# Patient Record
Sex: FEMALE | URBAN - METROPOLITAN AREA
[De-identification: names, ages, dates, MRNs, and addresses within clinical notes are randomized per-mention and may not be internally consistent; named-entity substitution may affect disease eponyms.]

---

## 2024-05-13 ENCOUNTER — TELEPHONE (OUTPATIENT)
Age: 69
End: 2024-05-13

## 2024-05-13 NOTE — TELEPHONE ENCOUNTER
Incoming Refill Request      Medication requested (name and dose): FOLIC ACID 1 MG    Pharmacy where request should be sent: [facility]    Additional details provided by patient: NONE    Best call back number: [phone]    Does the patient have less than a 3 day supply:  [x] Yes  [] No    Eloy Scales Rep  05/13/24, 08:52 EDT

## 2024-05-17 RX ORDER — FOLIC ACID 1 MG/1
1000 TABLET ORAL DAILY
Qty: 90 TABLET | Refills: 3 | OUTPATIENT
Start: 2024-05-17

## 2024-05-28 PROBLEM — M19.041 PRIMARY OSTEOARTHRITIS OF BOTH HANDS: Status: ACTIVE | Noted: 2024-05-28

## 2024-05-28 PROBLEM — M05.79 RHEUMATOID ARTHRITIS INVOLVING MULTIPLE SITES WITH POSITIVE RHEUMATOID FACTOR: Status: ACTIVE | Noted: 2024-05-28

## 2024-05-28 PROBLEM — Z79.899 HIGH RISK MEDICATION USE: Status: ACTIVE | Noted: 2024-05-28

## 2024-05-28 PROBLEM — M19.042 PRIMARY OSTEOARTHRITIS OF BOTH HANDS: Status: ACTIVE | Noted: 2024-05-28

## 2024-05-28 NOTE — PROGRESS NOTES
Office Follow Up      Date: 05/30/2024   Patient Name: Joy Bowden  MRN: [id number]  YOB: 1955    Referring Physician: Reshma Carmona, *     Chief Complaint: Rheumatoid arthritis.      History of Present Illness: Joy Bowden is a 69 y.o. female who is here today for follow up on rheumatoid arthritis.  She continues to do well without swelling.  Occasional wrist pain without swelling.  No SE's of meds. No infections.  Has alpha-1 antitrypsin deficiency and is getting monthly infusions of Aralast. She has COPD.  Pain scale: 2/10. The problem has not changed. The primary symptoms reported include: stiffness. The following symptoms are not reported:  pain and swelling. The patient assesses the interval disease activity as: stable. The patient's assessment of treatment is: helping greatly. The patient is not experiencing side effects. No locations affected since patient's last visit. Associated symptoms include AM stiffness (1 Hour) and change in vision. Pertinent negatives include fever, fatigue, inactivity gelling, eye symptoms, sicca complaints, skin lesion(s), chest pain, changing cough, edema, joint swelling and abdominal pain.     Subjective   Review of Systems: Review of Systems   Constitutional:  Positive for unexpected weight gain. Negative for chills, fatigue, fever and unexpected weight loss.   HENT:  Positive for postnasal drip and voice change. Negative for dental problem, mouth sores, sinus pressure and swollen glands.    Eyes:  Positive for visual disturbance. Negative for photophobia, pain and redness.   Respiratory:  Positive for shortness of breath. Negative for apnea, cough and chest tightness.    Cardiovascular:  Negative for chest pain and leg swelling.   Gastrointestinal:  Positive for abdominal distention, constipation and GERD. Negative for abdominal pain, diarrhea and nausea.   Endocrine:        Hair loss   Genitourinary:  Negative for  dysuria and hematuria.   Musculoskeletal:  Positive for back pain (lower back pain).        Joint tenderness     Skin:  Negative for dry skin, rash, skin lesions and bruise.   Neurological:  Negative for dizziness, seizures, syncope, weakness, headache and memory problem.   Hematological:  Negative for adenopathy. Does not bruise/bleed easily.   Psychiatric/Behavioral:  Positive for sleep disturbance. Negative for suicidal ideas, depressed mood and stress. The patient is not nervous/anxious.    All other systems reviewed and are negative.       Medications:   Current Outpatient Medications:     Aralast NP 1000 MG injection, Infuse 60 mg/kg into a venous catheter 1 (One) Time Per Week., Disp: , Rfl:     Fluticasone-Umeclidin-Vilant (Trelegy Ellipta) 100-62.5-25 MCG/ACT inhaler, Inhale 1 puff Daily. AT THE SAME EACH DAY, Disp: , Rfl:     folic acid (FOLVITE) 1 MG tablet, Take 1 tablet by mouth Daily. 1 tablet by oral route every day, Disp: 30 tablet, Rfl: 0    losartan (COZAAR) 50 MG tablet, Take 1 tablet by mouth Daily., Disp: , Rfl:     methotrexate 2.5 MG tablet, Take 6 tablets by mouth 1 (One) Time Per Week., Disp: , Rfl:     pantoprazole (PROTONIX) 40 MG EC tablet, Take 1 tablet by mouth Daily., Disp: , Rfl:     pravastatin (PRAVACHOL) 40 MG tablet, Take 1 tablet by mouth Daily., Disp: , Rfl:     QUEtiapine (SEROquel) 100 MG tablet, Take 1 tablet by mouth Every Night., Disp: , Rfl:     sertraline (ZOLOFT) 50 MG tablet, Take 1 tablet by mouth Daily., Disp: , Rfl:     gabapentin (NEURONTIN) 100 MG capsule, Take 2 capsules by mouth every night at bedtime., Disp: , Rfl:     Allergies:   Allergies   Allergen Reactions    Acetaminophen Provider Review Needed    Propoxyphene Napsylate [Propoxyphene] Provider Review Needed    Trazodone Other (See Comments)     Pt does not remember the reaction caused        I have reviewed and updated the patient's chief complaint, history of present illness, review of systems, past  "medical history, surgical history, family history, social history, medications and allergy list as appropriate.     Objective    Vital Signs:   Vitals:    05/30/24 0908   BP: 136/72   Pulse: 86   Temp: 97.8 °F (36.6 °C)   Weight: 73.3 kg (161 lb 9.6 oz)   Height: 154.9 cm (61\")   PainSc:   2   PainLoc: Generalized  Comment: Moves     Body mass index is 30.53 kg/m².    Physical Exam:  GENERAL:  The patient is well-developed and well nourished.  Cooperative and oriented x3.  Affect is normal.  Hydration appears normal.  HEENT:  Normocephalic and atraumatic.  No notable alopecia.  No facial rash.  Lids and conjunctiva are normal.  Pupils are equal and sclerae are clear.  I see no oral or nasal ulcers.  Lips, teeth, and gums are within normal limits.  Oropharynx is clear.  NECK:  Neck is supple without adenopathy, masses, or thyromegaly.  CARDIOVASCULAR:  Normal S1, S2.  No murmurs are heard.  LUNGS:  Clear to auscultation and percussion.  Markedly decreased breath sounds.  ABDOMEN:  Soft and nontender without HSM.   EXTREMITIES:  Trace pedal edema.  No cyanosis or clubbing.  SKIN:  Palpation and inspection are normal.  I see no rashes, nodules, psoriatic lesions, or tophi.  NEUROLOGIC:  Gait is normal.    MUSCULOSKELETAL:  Complete joint exam was performed.  There was full range of motion of the shoulders, elbows, wrists, and hands without notable deformities, soft tissue swelling, synovitis, or atrophy except as noted.  Mild discomfort with abduction of the left shoulder with full range of motion.  Mild degenerative changes are seen in the hands.  Hips have good flexion and internal and external rotation.  Knees have no palpable effusions.  There is full flexion and full extension of the knees without pain.  Ankles have no soft tissue swelling or synovitis or major deformities.    BACK:  Straight without scoliosis.  There is mild kyphosis.  Joint Exam 05/30/2024     The following joints were examined and normal: "   Left Glenohumeral, Right Glenohumeral, Left Elbow, Right Elbow, Left Wrist, Right Wrist, Left MCP 1, Right MCP 1, Left MCP 2, Right MCP 2, Left MCP 3, Right MCP 3, Left MCP 4, Right MCP 4, Left MCP 5, Right MCP 5, Left IP (thumb), Right IP (thumb), Left PIP 2 (finger), Right PIP 2 (finger), Left PIP 3 (finger), Right PIP 3 (finger), Left PIP 4 (finger), Right PIP 4 (finger), Left PIP 5 (finger), Right PIP 5 (finger), Left Knee, Right Knee       Patient Global: 8 mm  Provider Global: 1 mm    Assessment / Plan      Assessment & Plan  Rheumatoid arthritis involving multiple sites with positive rheumatoid factor  Onset early 4/22.  Abrupt onset with pain and swelling in MCPs, PIPs, and wrists.  Negative GAVIOTA.  Greatly elevated sedimentation rate at 102 and CRP at 41.  Rheumatoid factor and CCP antibody all high positive.  Positive ASO titer with positive DNase B.  6/22 hand x-ray show no erosions.  MTX started 7/22.    Doing well.   Tender joint count is  0, swollen joint count is 0 , physician global is  1 , visual analog pain scale is 2 , pt global is 8.   CDAI is 9-low disease activity.  Prognosis is good.   Continue MTX.   Order for labs given.   Recheck in 3 months.  Primary osteoarthritis of both hands  Mild and stable.  High risk medication use  Methotrexate.  She understands the need for regular 6 to 8-week labs.  Chronic obstructive pulmonary disease, unspecified COPD type  Apparently associated with alpha-1 antitrypsin deficiency. On Aralast.               Follow Up:   No follow-ups on file.        Girma Steinberg MD  [facility] [place]

## 2024-05-28 NOTE — ASSESSMENT & PLAN NOTE
Onset early 4/22.  Abrupt onset with pain and swelling in MCPs, PIPs, and wrists.  Negative GAVIOTA.  Greatly elevated sedimentation rate at 102 and CRP at 41.  Rheumatoid factor and CCP antibody all high positive.  Positive ASO titer with positive DNase B.  6/22 hand x-ray show no erosions.  MTX started 7/22.    Doing well.   Tender joint count is  0, swollen joint count is 0 , physician global is  1 , visual analog pain scale is 2 , pt global is 8.   CDAI is 9-low disease activity.  Prognosis is good.   Continue MTX.   Order for labs given.   Recheck in 3 months.

## 2024-05-30 ENCOUNTER — OFFICE VISIT (OUTPATIENT)
Age: 69
End: 2024-05-30
Payer: MEDICARE

## 2024-05-30 VITALS
WEIGHT: 161.6 LBS | SYSTOLIC BLOOD PRESSURE: 136 MMHG | DIASTOLIC BLOOD PRESSURE: 72 MMHG | TEMPERATURE: 97.8 F | BODY MASS INDEX: 30.51 KG/M2 | HEART RATE: 86 BPM | HEIGHT: 61 IN

## 2024-05-30 DIAGNOSIS — M19.041 PRIMARY OSTEOARTHRITIS OF BOTH HANDS: ICD-10-CM

## 2024-05-30 DIAGNOSIS — J44.9 CHRONIC OBSTRUCTIVE PULMONARY DISEASE, UNSPECIFIED COPD TYPE: ICD-10-CM

## 2024-05-30 DIAGNOSIS — M05.79 RHEUMATOID ARTHRITIS INVOLVING MULTIPLE SITES WITH POSITIVE RHEUMATOID FACTOR: Primary | ICD-10-CM

## 2024-05-30 DIAGNOSIS — Z79.899 HIGH RISK MEDICATION USE: ICD-10-CM

## 2024-05-30 DIAGNOSIS — M19.042 PRIMARY OSTEOARTHRITIS OF BOTH HANDS: ICD-10-CM

## 2024-05-30 RX ORDER — METHOTREXATE 2.5 MG/1
6 TABLET ORAL WEEKLY
COMMUNITY
Start: 2024-02-23 | End: 2024-05-30 | Stop reason: SDUPTHER

## 2024-05-30 RX ORDER — ALPHA-1-PROTEINASE INHIBITOR (HUMAN) 1000 MG
60 KIT INTRAVENOUS WEEKLY
COMMUNITY
Start: 2024-05-23

## 2024-05-30 RX ORDER — FOLIC ACID 1 MG/1
1 TABLET ORAL DAILY
Qty: 30 TABLET | Refills: 5 | Status: SHIPPED | OUTPATIENT
Start: 2024-05-30

## 2024-05-30 RX ORDER — METHOTREXATE 2.5 MG/1
15 TABLET ORAL WEEKLY
Qty: 30 TABLET | Refills: 3 | Status: SHIPPED | OUTPATIENT
Start: 2024-05-30

## 2024-06-04 ENCOUNTER — TELEPHONE (OUTPATIENT)
Age: 69
End: 2024-06-04
Payer: MEDICARE

## 2024-06-04 NOTE — TELEPHONE ENCOUNTER
----- Message from Girma Steinberg sent at 6/3/2024  2:04 PM EDT -----  Inflammation test is elevated.    Called and left vm for patient to return call    OK TO RELAY

## 2024-06-07 ENCOUNTER — SPECIALTY PHARMACY (OUTPATIENT)
Age: 69
End: 2024-06-07
Payer: MEDICARE

## 2024-06-17 ENCOUNTER — TELEPHONE (OUTPATIENT)
Age: 69
End: 2024-06-17
Payer: MEDICARE

## 2024-09-08 PROBLEM — J44.9 CHRONIC OBSTRUCTIVE PULMONARY DISEASE: Status: ACTIVE | Noted: 2024-09-08

## 2024-09-08 NOTE — ASSESSMENT & PLAN NOTE
Onset early 4/22.  Abrupt onset with pain and swelling in MCPs, PIPs, and wrists.  Negative GAVIOTA.  Greatly elevated sedimentation rate at 102 and CRP at 41.  Rheumatoid factor and CCP antibody all high positive.  Positive ASO titer with positive DNase B.  6/22 hand x-ray show no erosions.  MTX started 7/22.    Doing well.   Tender joint count is  0, swollen joint count is 0 , physician global is  1 , visual analog pain scale is 2 , pt global is 9.   CDAI is 9-low disease activity.  Prognosis is good.   Continue MTX.   She has migratory joint pain.  Today she has some minor pain right wrist.  Order for labs given.   Recheck in 3 months.

## 2024-09-09 ENCOUNTER — OFFICE VISIT (OUTPATIENT)
Age: 69
End: 2024-09-09
Payer: MEDICARE

## 2024-09-09 VITALS
SYSTOLIC BLOOD PRESSURE: 108 MMHG | DIASTOLIC BLOOD PRESSURE: 58 MMHG | HEIGHT: 61 IN | WEIGHT: 166.2 LBS | BODY MASS INDEX: 31.38 KG/M2 | TEMPERATURE: 97.8 F | HEART RATE: 97 BPM

## 2024-09-09 DIAGNOSIS — Z79.899 HIGH RISK MEDICATION USE: ICD-10-CM

## 2024-09-09 DIAGNOSIS — M05.79 RHEUMATOID ARTHRITIS INVOLVING MULTIPLE SITES WITH POSITIVE RHEUMATOID FACTOR: Primary | ICD-10-CM

## 2024-09-09 DIAGNOSIS — M19.041 PRIMARY OSTEOARTHRITIS OF BOTH HANDS: ICD-10-CM

## 2024-09-09 DIAGNOSIS — M19.042 PRIMARY OSTEOARTHRITIS OF BOTH HANDS: ICD-10-CM

## 2024-09-09 DIAGNOSIS — J44.9 CHRONIC OBSTRUCTIVE PULMONARY DISEASE, UNSPECIFIED COPD TYPE: ICD-10-CM

## 2024-09-09 PROCEDURE — 99214 OFFICE O/P EST MOD 30 MIN: CPT | Performed by: NURSE PRACTITIONER

## 2024-09-09 PROCEDURE — G2211 COMPLEX E/M VISIT ADD ON: HCPCS | Performed by: NURSE PRACTITIONER

## 2024-09-09 RX ORDER — FLUTICASONE FUROATE, UMECLIDINIUM BROMIDE AND VILANTEROL TRIFENATATE 200; 62.5; 25 UG/1; UG/1; UG/1
1 POWDER RESPIRATORY (INHALATION)
COMMUNITY
Start: 2024-08-27

## 2024-09-09 NOTE — PROGRESS NOTES
Office Visit       Date: 2024   Patient Name: Joy Bowden  MRN: [id number]  YOB: 1955    Referring Physician: Referring, Self     Chief Complaint:   Chief Complaint   Patient presents with    Rheumatoid Arthritis    Osteoarthritis       History of Present Illness: Joy Bowden is a 69 y.o. female who is here today for follow-up of rheumatoid arthritis.  Today she reports feeling the same.  She rates her pain 0 out of 10 and has 1 hour morning stiffness.  She does rate her global 9 out of 10.  She declines the need for refills today.  We do prescribe her methotrexate 6 tablets weekly and folic acid 1 mg daily.  She had a Aralast infusion since we last saw her.    Subjective   Review of Systems:  Review of symptoms positive for weight gain, hearing loss, hoarseness, nasal sores, shortness of breath, heartburn, hair loss, insomnia, bruising, joint tenderness and low back pain.    Past Medical History:   Past Medical History:   Diagnosis Date    COPD (chronic obstructive pulmonary disease)     Elevated lipids     GERD (gastroesophageal reflux disease)        Past Surgical History:   Past Surgical History:   Procedure Laterality Date    APPENDECTOMY       SECTION      CHOLECYSTECTOMY      HYSTERECTOMY      COMPLETE    TONSILLECTOMY         Family History: History reviewed. No pertinent family history.    Social History:   Social History     Socioeconomic History    Marital status:    Tobacco Use    Smoking status: Former     Types: Cigarettes     Passive exposure: Past    Smokeless tobacco: Never   Vaping Use    Vaping status: Never Used   Substance and Sexual Activity    Alcohol use: Not Currently    Drug use: Never    Sexual activity: Defer       Medications:   Current Outpatient Medications:     Aralast NP 1000 MG injection, Infuse 60 mg/kg into a venous catheter 1 (One) Time Per Week., Disp: , Rfl:     folic acid (FOLVITE) 1 MG tablet, Take 1 tablet by  "mouth Daily. 1 tablet by oral route every day, Disp: 30 tablet, Rfl: 5    gabapentin (NEURONTIN) 100 MG capsule, Take 2 capsules by mouth every night at bedtime., Disp: , Rfl:     losartan (COZAAR) 50 MG tablet, Take 1 tablet by mouth Daily., Disp: , Rfl:     methotrexate 2.5 MG tablet, Take 6 tablets by mouth 1 (One) Time Per Week., Disp: 30 tablet, Rfl: 3    pantoprazole (PROTONIX) 40 MG EC tablet, Take 1 tablet by mouth Daily., Disp: , Rfl:     pravastatin (PRAVACHOL) 40 MG tablet, Take 1 tablet by mouth Daily., Disp: , Rfl:     QUEtiapine (SEROquel) 100 MG tablet, Take 1 tablet by mouth Every Night., Disp: , Rfl:     sertraline (ZOLOFT) 50 MG tablet, Take 1 tablet by mouth Daily., Disp: , Rfl:     Trelegy Ellipta 200-62.5-25 MCG/ACT inhaler, Inhale 1 puff Daily., Disp: , Rfl:     Allergies:   Allergies   Allergen Reactions    Acetaminophen Provider Review Needed    Propoxyphene Napsylate [Propoxyphene] Provider Review Needed    Trazodone Other (See Comments)     Pt does not remember the reaction caused        I have reviewed and updated the patient's chief complaint, history of present illness, review of systems, past medical history, surgical history, family history, social history, medications and allergy list as appropriate.     Objective    Vital Signs:   Vitals:    09/09/24 0854   BP: 108/58   BP Location: Left arm   Patient Position: Sitting   Cuff Size: Adult   Pulse: 97   Temp: 97.8 °F (36.6 °C)   Weight: 75.4 kg (166 lb 3.2 oz)   Height: 154.9 cm (60.98\")   PainSc: 0-No pain     Body mass index is 31.42 kg/m².   Defer to pcp       Physical Exam:  Physical Exam  Vitals reviewed.   Constitutional:       Appearance: Normal appearance.   HENT:      Head: Normocephalic and atraumatic.      Mouth/Throat:      Mouth: Mucous membranes are moist.   Eyes:      Conjunctiva/sclera: Conjunctivae normal.   Cardiovascular:      Rate and Rhythm: Normal rate and regular rhythm.      Pulses: Normal pulses.      Heart " sounds: Normal heart sounds.   Pulmonary:      Effort: Pulmonary effort is normal.      Breath sounds: Normal breath sounds.   Musculoskeletal:         General: Normal range of motion.      Cervical back: Normal range of motion and neck supple.      Comments: OA changes of bilateral hands, CMC squaring bilaterally.  No synovitis   Skin:     General: Skin is warm and dry.   Neurological:      General: No focal deficit present.      Mental Status: She is alert and oriented to person, place, and time. Mental status is at baseline.   Psychiatric:         Mood and Affect: Mood normal.         Behavior: Behavior normal.         Thought Content: Thought content normal.         Judgment: Judgment normal.          Results Review:   Imaging Results (Last 24 Hours)       ** No results found for the last 24 hours. **            Procedures    Assessment / Plan    Assessment/Plan:   Diagnoses and all orders for this visit:    1. Rheumatoid arthritis involving multiple sites with positive rheumatoid factor (Primary)  Assessment & Plan:  Onset early 4/22.  Abrupt onset with pain and swelling in MCPs, PIPs, and wrists.  Negative GAVIOTA.  Greatly elevated sedimentation rate at 102 and CRP at 41.  Rheumatoid factor and CCP antibody all high positive.  Positive ASO titer with positive DNase B.  6/22 hand x-ray show no erosions.  MTX started 7/22.    Doing well.   Tender joint count is  0, swollen joint count is 0 , physician global is  1 , visual analog pain scale is 2 , pt global is 9.   CDAI is 9-low disease activity.  Prognosis is good.   Continue MTX.   She has migratory joint pain.  Today she has some minor pain right wrist.  Order for labs given.   Recheck in 3 months.    Orders:  -     CBC With Manual Differential; Standing  -     Comprehensive Metabolic Panel; Standing  -     C-reactive Protein; Standing  -     Sedimentation Rate; Standing    2. Primary osteoarthritis of both hands  Assessment & Plan:  Mild and stable.  Mild pain  right wrist today.      3. High risk medication use  Assessment & Plan:  Methotrexate.  She understands the need for regular 6 to 8-week labs.    Orders:  -     CBC With Manual Differential; Standing  -     Comprehensive Metabolic Panel; Standing  -     C-reactive Protein; Standing  -     Sedimentation Rate; Standing    4. Chronic obstructive pulmonary disease, unspecified COPD type  Assessment & Plan:  Followed by pulmonology  Started on Aralast infusions every week          Follow Up:   Return in about 3 months (around 12/9/2024) for Dr. Steinberg, JOSE Sin.        JOSE Nguyen  [facility] [place] [facility]

## 2024-09-11 ENCOUNTER — TELEPHONE (OUTPATIENT)
Age: 69
End: 2024-09-11
Payer: MEDICARE

## 2024-09-11 NOTE — TELEPHONE ENCOUNTER
ELVIA REQUESTED A SCHEDULE CHANGE CAUSING CANCELLATIONS. PT CAN BE RESCHEDULED SAME DAY WITH KYLE OR [facility], OR BE SCHEDULED FARTHER OUT WITH [facility].     -DOUG

## 2024-12-04 DIAGNOSIS — Z79.899 HIGH RISK MEDICATION USE: ICD-10-CM

## 2024-12-04 DIAGNOSIS — M05.79 RHEUMATOID ARTHRITIS INVOLVING MULTIPLE SITES WITH POSITIVE RHEUMATOID FACTOR: Primary | ICD-10-CM

## 2024-12-06 RX ORDER — METHOTREXATE 2.5 MG/1
15 TABLET ORAL WEEKLY
Qty: 24 TABLET | Refills: 0 | Status: SHIPPED | OUTPATIENT
Start: 2024-12-06

## 2024-12-06 NOTE — TELEPHONE ENCOUNTER
Patient needs updated labs. I will send in one month worth. I placed the order for labs. Please mail her the order to have done. Thanks

## 2024-12-12 NOTE — ASSESSMENT & PLAN NOTE
Methotrexate.  She understands the need for regular 6 to 8-week labs. New order given.  9/2024 labs showed , CRP 25.1.  She should ensure all cancer screenings are up-to-date.  We will recheck inflammation markers today.   She also had mild elevation in ALT at 39 and mild decrease in GFR at 54 on 9/2024 labs.  We will recheck these today.

## 2024-12-12 NOTE — PROGRESS NOTES
Office Visit       Date: 2024   Patient Name: Joy Bowden  MRN: [id number]  YOB: 1955    Referring Physician: Reshma Carmona, *     Chief Complaint:   Chief Complaint   Patient presents with    Follow-up    Rheumatoid Arthritis    Osteoarthritis       History of Present Illness: Joy Bowden is a 69 y.o. female who is here today for follow-up of rheumatoid arthritis.      Today she reports feeling the same.    She rates her pain 0 out of 10 and has 2 hours of morning stiffness.   We prescribe her methotrexate 6 tablets weekly and folic acid 1 mg daily.  Medication well-tolerated.  No recent illness or infection.  She continues on Aralast with her pulmonologist.    Subjective   Review of Systems:  Review of symptoms positive for hearing loss, voice change, shortness of breath, constipation, GERD, bruising, dry skin.  Otherwise negative ROS.    Past Medical History:   Past Medical History:   Diagnosis Date    COPD (chronic obstructive pulmonary disease)     Elevated lipids     GERD (gastroesophageal reflux disease)        Past Surgical History:   Past Surgical History:   Procedure Laterality Date    APPENDECTOMY       SECTION      CHOLECYSTECTOMY      HYSTERECTOMY      COMPLETE    TONSILLECTOMY         Family History: History reviewed. No pertinent family history.    Social History:   Social History     Socioeconomic History    Marital status:    Tobacco Use    Smoking status: Former     Types: Cigarettes     Passive exposure: Past    Smokeless tobacco: Never   Vaping Use    Vaping status: Never Used   Substance and Sexual Activity    Alcohol use: Not Currently    Drug use: Never    Sexual activity: Defer       Medications:   Current Outpatient Medications:     Aralast NP 1000 MG injection, Infuse 60 mg/kg into a venous catheter 1 (One) Time Per Week., Disp: , Rfl:     folic acid (FOLVITE) 1 MG tablet, Take 1 tablet by mouth Daily. 1 tablet by oral  "route every day, Disp: 90 tablet, Rfl: 1    gabapentin (NEURONTIN) 100 MG capsule, Take 2 capsules by mouth every night at bedtime., Disp: , Rfl:     losartan (COZAAR) 50 MG tablet, Take 1 tablet by mouth Daily., Disp: , Rfl:     methotrexate 2.5 MG tablet, Take 6 tablets by mouth 1 (One) Time Per Week., Disp: 24 tablet, Rfl: 3    pantoprazole (PROTONIX) 40 MG EC tablet, Take 1 tablet by mouth Daily., Disp: , Rfl:     pravastatin (PRAVACHOL) 40 MG tablet, Take 1 tablet by mouth Daily., Disp: , Rfl:     QUEtiapine (SEROquel) 100 MG tablet, Take 1 tablet by mouth Every Night., Disp: , Rfl:     sertraline (ZOLOFT) 50 MG tablet, Take 1 tablet by mouth Daily., Disp: , Rfl:     Trelegy Ellipta 200-62.5-25 MCG/ACT inhaler, Inhale 1 puff Daily., Disp: , Rfl:     Allergies:   Allergies   Allergen Reactions    Acetaminophen Provider Review Needed    Propoxyphene Napsylate [Propoxyphene] Provider Review Needed    Trazodone Other (See Comments)     Pt does not remember the reaction caused        I have reviewed and updated the patient's chief complaint, history of present illness, review of systems, past medical history, surgical history, family history, social history, medications and allergy list as appropriate.     Objective    Vital Signs:   Vitals:    12/17/24 1108   BP: 138/78   Pulse: 109   Temp: 97.1 °F (36.2 °C)   Weight: 75.8 kg (167 lb)   Height: 154.9 cm (61\")   PainSc: 0-No pain       Body mass index is 31.55 kg/m².   Defer to pcp       Physical Exam:  Physical Exam  Vitals reviewed.   Constitutional:       Appearance: Normal appearance.   HENT:      Head: Normocephalic and atraumatic.      Mouth/Throat:      Mouth: Mucous membranes are moist.   Eyes:      Conjunctiva/sclera: Conjunctivae normal.   Cardiovascular:      Rate and Rhythm: Normal rate and regular rhythm.      Pulses: Normal pulses.      Heart sounds: Normal heart sounds.   Pulmonary:      Effort: Pulmonary effort is normal.      Breath sounds: Normal " breath sounds.   Musculoskeletal:         General: Normal range of motion.      Cervical back: Normal range of motion and neck supple.      Comments: OA changes of bilateral hands, CMC squaring bilaterally.  No synovitis   Skin:     General: Skin is warm and dry.   Neurological:      General: No focal deficit present.      Mental Status: She is alert and oriented to person, place, and time. Mental status is at baseline.   Psychiatric:         Mood and Affect: Mood normal.         Behavior: Behavior normal.         Thought Content: Thought content normal.         Judgment: Judgment normal.              Metrics  ARCE-28 (ESR): --  ARCE-28 (CRP): --  Tender (ARCE-28): 0 / 28   Swollen (ARCE-28): 0 / 28     This Exam  Provider Global: 10 / 100  Patient Global: 20 / 100  ESR: --  CRP: --      Assessment / Plan    Assessment/Plan:   Diagnoses and all orders for this visit:    1. Rheumatoid arthritis involving multiple sites with positive rheumatoid factor (Primary)  Assessment & Plan:  Onset early 4/22.  Abrupt onset with pain and swelling in MCPs, PIPs, and wrists.  Negative GAVIOTA.  Greatly elevated sedimentation rate at 102 and CRP at 41.  Rheumatoid factor and CCP antibody all high positive.  Positive ASO titer with positive DNase B.  6/22 hand x-ray show no erosions.  MTX started 7/22.    Doing well.   Tender joint count is  0, swollen joint count is 0 , physician global is  1 , visual analog pain scale is 0 , pt global is 2.   CDAI is 3 -low disease activity.  Prognosis is good.   Continue MTX.   Order for labs given.   Recheck in 3 months.    Orders:  -     folic acid (FOLVITE) 1 MG tablet; Take 1 tablet by mouth Daily. 1 tablet by oral route every day  Dispense: 90 tablet; Refill: 1  -     methotrexate 2.5 MG tablet; Take 6 tablets by mouth 1 (One) Time Per Week.  Dispense: 24 tablet; Refill: 3  -     Comprehensive Metabolic Panel; Standing  -     C-reactive Protein; Standing  -     Sedimentation Rate; Standing  -      CBC Auto Differential; Standing    2. High risk medication use  Assessment & Plan:  Methotrexate.  She understands the need for regular 6 to 8-week labs. New order given.  9/2024 labs showed , CRP 25.1.  She should ensure all cancer screenings are up-to-date.  We will recheck inflammation markers today.   She also had mild elevation in ALT at 39 and mild decrease in GFR at 54 on 9/2024 labs.  We will recheck these today.    Orders:  -     folic acid (FOLVITE) 1 MG tablet; Take 1 tablet by mouth Daily. 1 tablet by oral route every day  Dispense: 90 tablet; Refill: 1  -     methotrexate 2.5 MG tablet; Take 6 tablets by mouth 1 (One) Time Per Week.  Dispense: 24 tablet; Refill: 3  -     Comprehensive Metabolic Panel; Standing  -     C-reactive Protein; Standing  -     Sedimentation Rate; Standing  -     CBC Auto Differential; Standing    3. Primary osteoarthritis of both hands  Assessment & Plan:  Mild and stable. Not bothering her much today.      4. Chronic obstructive pulmonary disease, unspecified COPD type  Assessment & Plan:  Followed by pulmonology  Continues on Aralast every week        Follow Up:   Return in about 3 months (around 3/17/2025) for Dr. Steinberg, JOSE Dumont.    JOSE Dumont  [facility] [place] [facility] [place]

## 2024-12-12 NOTE — ASSESSMENT & PLAN NOTE
Onset early 4/22.  Abrupt onset with pain and swelling in MCPs, PIPs, and wrists.  Negative GAVIOTA.  Greatly elevated sedimentation rate at 102 and CRP at 41.  Rheumatoid factor and CCP antibody all high positive.  Positive ASO titer with positive DNase B.  6/22 hand x-ray show no erosions.  MTX started 7/22.    Doing well.   Tender joint count is  0, swollen joint count is 0 , physician global is  1 , visual analog pain scale is 0 , pt global is 2.   CDAI is 3 -low disease activity.  Prognosis is good.   Continue MTX.   Order for labs given.   Recheck in 3 months.

## 2024-12-17 ENCOUNTER — OFFICE VISIT (OUTPATIENT)
Age: 69
End: 2024-12-17
Payer: MEDICARE

## 2024-12-17 VITALS
TEMPERATURE: 97.1 F | DIASTOLIC BLOOD PRESSURE: 78 MMHG | SYSTOLIC BLOOD PRESSURE: 138 MMHG | WEIGHT: 167 LBS | HEIGHT: 61 IN | HEART RATE: 109 BPM | BODY MASS INDEX: 31.53 KG/M2

## 2024-12-17 DIAGNOSIS — M19.041 PRIMARY OSTEOARTHRITIS OF BOTH HANDS: ICD-10-CM

## 2024-12-17 DIAGNOSIS — M05.79 RHEUMATOID ARTHRITIS INVOLVING MULTIPLE SITES WITH POSITIVE RHEUMATOID FACTOR: Primary | ICD-10-CM

## 2024-12-17 DIAGNOSIS — M19.042 PRIMARY OSTEOARTHRITIS OF BOTH HANDS: ICD-10-CM

## 2024-12-17 DIAGNOSIS — Z79.899 HIGH RISK MEDICATION USE: ICD-10-CM

## 2024-12-17 DIAGNOSIS — J44.9 CHRONIC OBSTRUCTIVE PULMONARY DISEASE, UNSPECIFIED COPD TYPE: ICD-10-CM

## 2024-12-17 PROBLEM — E78.5 HYPERLIPIDEMIA: Status: ACTIVE | Noted: 2022-11-22

## 2024-12-17 PROBLEM — I10 ESSENTIAL HYPERTENSION: Status: ACTIVE | Noted: 2022-11-22

## 2024-12-17 PROBLEM — E88.09 ALPHA-1-ANTICHYMOTRYPSIN DEFICIENCY: Status: ACTIVE | Noted: 2023-01-09

## 2024-12-17 PROBLEM — K21.9 GASTROESOPHAGEAL REFLUX DISEASE: Status: ACTIVE | Noted: 2022-11-22

## 2024-12-17 PROBLEM — R73.03 PREDIABETES: Status: ACTIVE | Noted: 2023-06-20

## 2024-12-17 PROBLEM — F41.8 MIXED ANXIETY AND DEPRESSIVE DISORDER: Status: ACTIVE | Noted: 2022-11-22

## 2024-12-17 RX ORDER — FOLIC ACID 1 MG/1
1 TABLET ORAL DAILY
Qty: 90 TABLET | Refills: 1 | Status: SHIPPED | OUTPATIENT
Start: 2024-12-17

## 2024-12-17 RX ORDER — METHOTREXATE 2.5 MG/1
15 TABLET ORAL WEEKLY
Qty: 24 TABLET | Refills: 3 | Status: SHIPPED | OUTPATIENT
Start: 2024-12-17

## 2024-12-24 DIAGNOSIS — M05.79 RHEUMATOID ARTHRITIS INVOLVING MULTIPLE SITES WITH POSITIVE RHEUMATOID FACTOR: Primary | ICD-10-CM

## 2024-12-24 DIAGNOSIS — Z79.899 HIGH RISK MEDICATION USE: ICD-10-CM

## 2024-12-26 ENCOUNTER — TELEPHONE (OUTPATIENT)
Age: 69
End: 2024-12-26
Payer: MEDICARE

## 2025-04-08 NOTE — ASSESSMENT & PLAN NOTE
Onset early 4/22.  Abrupt onset with pain and swelling in MCPs, PIPs, and wrists.  Negative GAVIOTA.  Greatly elevated sedimentation rate at 102 and CRP at 41.  Rheumatoid factor and CCP antibody all high positive.  Positive ASO titer with positive DNase B.  6/22 hand x-ray show no erosions.  MTX started 7/22.    Doing well.   Tender joint count is  0, swollen joint count is 0 , physician global is  0 , visual analog pain scale is 01, pt global is 8.   CDAI is 8 -low disease activity.  Prognosis is good.   Continue MTX.   Order for labs given.   Her chronically elevated ESR is likely associated with her Aralast infusions.   Recheck in 3 months.

## 2025-04-08 NOTE — PROGRESS NOTES
Office Follow Up      Date: 04/10/2025   Patient Name: Joy Bowden  MRN: [id number]  YOB: 1955    Referring Physician: No ref. provider found     Chief Complaint: Rheumatoid arthritis.      History of Present Illness: Joy Bowden is a 70 y.o. female who is here today for follow up on rheumatoid arthritis.  Doing well. No joint swelling.   She is on MTX. No SE's of meds. No infections.    Has alpha-1 antitrypsin deficiency and is getting monthly infusions of Aralast. She has COPD.  Pain scale: 2/10. EMS is 1 hr.   The problem has not changed. The primary symptoms reported include: stiffness. The following symptoms are not reported:  pain and swelling. The patient assesses the interval disease activity as: stable. The patient's assessment of treatment is: helping greatly. The patient is not experiencing side effects. No locations affected since patient's last visit. Associated symptoms include AM stiffness (1 Hour) and change in vision. Pertinent negatives include fever, fatigue, inactivity gelling, eye symptoms, sicca complaints, skin lesion(s), chest pain, changing cough, edema, joint swelling and abdominal pain.     Subjective   Review of Systems: Review of Systems   Constitutional:  Negative for chills, fatigue, fever and unexpected weight loss.   HENT:  Positive for voice change. Negative for dental problem, mouth sores, sinus pressure and swollen glands.    Eyes:  Negative for photophobia, pain and redness.   Respiratory:  Negative for apnea, cough, chest tightness and shortness of breath.    Cardiovascular:  Negative for chest pain and leg swelling.   Gastrointestinal:  Positive for constipation, GERD and indigestion. Negative for abdominal pain, diarrhea and nausea.   Genitourinary:  Negative for dysuria and hematuria.   Musculoskeletal:  Positive for arthralgias and joint swelling.   Skin:  Positive for dry skin. Negative for rash, skin lesions and bruise.         Hair loss     Neurological:  Negative for dizziness, seizures, syncope, weakness, headache and memory problem.   Hematological:  Negative for adenopathy. Does not bruise/bleed easily.   Psychiatric/Behavioral:  Negative for sleep disturbance, suicidal ideas, depressed mood and stress. The patient is not nervous/anxious.    All other systems reviewed and are negative.       Medications:   Current Outpatient Medications:     amLODIPine (NORVASC) 2.5 MG tablet, Take 1 tablet by mouth Daily., Disp: , Rfl:     Aralast NP 1000 MG injection, Infuse 60 mg/kg into a venous catheter 1 (One) Time Per Week., Disp: , Rfl:     folic acid (FOLVITE) 1 MG tablet, Take 1 tablet by mouth Daily. 1 tablet by oral route every day, Disp: 90 tablet, Rfl: 1    gabapentin (NEURONTIN) 100 MG capsule, Take 2 capsules by mouth every night at bedtime., Disp: , Rfl:     losartan (COZAAR) 50 MG tablet, Take 1 tablet by mouth Daily., Disp: , Rfl:     methotrexate 2.5 MG tablet, Take 6 tablets by mouth 1 (One) Time Per Week., Disp: 24 tablet, Rfl: 3    pantoprazole (PROTONIX) 40 MG EC tablet, Take 1 tablet by mouth Daily., Disp: , Rfl:     pravastatin (PRAVACHOL) 40 MG tablet, Take 1 tablet by mouth Daily., Disp: , Rfl:     QUEtiapine (SEROquel) 100 MG tablet, Take 1 tablet by mouth Every Night., Disp: , Rfl:     sertraline (ZOLOFT) 50 MG tablet, Take 1 tablet by mouth Daily., Disp: , Rfl:     Trelegy Ellipta 200-62.5-25 MCG/ACT inhaler, Inhale 1 puff Daily., Disp: , Rfl:     valsartan (DIOVAN) 160 MG tablet, Take 2 tablets by mouth Daily., Disp: , Rfl:     Allergies:   Allergies   Allergen Reactions    Acetaminophen Provider Review Needed    Propoxyphene Napsylate [Propoxyphene] Provider Review Needed    Trazodone Other (See Comments)     Pt does not remember the reaction caused        I have reviewed and updated the patient's chief complaint, history of present illness, review of systems, past medical history, surgical history, family history,  "social history, medications and allergy list as appropriate.     Objective    Vital Signs:   Vitals:    04/10/25 1115   BP: 128/62   Pulse: 97   Temp: 97.5 °F (36.4 °C)   Weight: 76.7 kg (169 lb)   Height: 154.9 cm (60.98\")   PainSc: 1    PainLoc: Generalized  Comment: More of an ache       Body mass index is 31.95 kg/m².    Physical Exam:  GENERAL:  The patient is well-developed and well nourished.  Cooperative and oriented x3.  Affect is normal.  Hydration appears normal.  HEENT:  Normocephalic and atraumatic.  No notable alopecia.  No facial rash.  Lids and conjunctiva are normal.  Pupils are equal and sclerae are clear.  I see no oral or nasal ulcers.  Lips, teeth, and gums are within normal limits.  Oropharynx is clear.  NECK:  Neck is supple without adenopathy, masses, or thyromegaly.  CARDIOVASCULAR:  Normal S1, S2.  No murmurs are heard.  LUNGS:  Clear to auscultation and percussion.  Markedly decreased breath sounds.  ABDOMEN:  Soft and nontender without HSM.   EXTREMITIES:  Trace pedal edema.  No cyanosis or clubbing.  SKIN:  Palpation and inspection are normal.  I see no rashes, nodules, psoriatic lesions, or tophi.  NEUROLOGIC:  Gait is normal.    MUSCULOSKELETAL:  Complete joint exam was performed.  There was full range of motion of the shoulders, elbows, wrists, and hands without notable deformities, soft tissue swelling, synovitis, or atrophy except as noted.  Mild discomfort with abduction of the left shoulder with full range of motion.  Mild degenerative changes are seen in the hands.  Hips have good flexion and internal and external rotation.  Knees have no palpable effusions.  There is full flexion and full extension of the knees without pain.  Ankles have no soft tissue swelling or synovitis or major deformities.    BACK:  Straight without scoliosis.  There is mild kyphosis.  Joint Exam 04/10/2025     The following joints were examined and normal:   Left Glenohumeral, Right Glenohumeral, Left " Elbow, Right Elbow, Left Wrist, Right Wrist, Left MCP 1, Right MCP 1, Left MCP 2, Right MCP 2, Left MCP 3, Right MCP 3, Left MCP 4, Right MCP 4, Left MCP 5, Right MCP 5, Left IP (thumb), Right IP (thumb), Left PIP 2 (finger), Right PIP 2 (finger), Left PIP 3 (finger), Right PIP 3 (finger), Left PIP 4 (finger), Right PIP 4 (finger), Left PIP 5 (finger), Right PIP 5 (finger), Left Knee, Right Knee       Patient Global: 80 / 100  Provider Global: 0 / 100        Assessment / Plan      Assessment & Plan  Rheumatoid arthritis involving multiple sites with positive rheumatoid factor  Onset early 4/22.  Abrupt onset with pain and swelling in MCPs, PIPs, and wrists.  Negative GAVIOTA.  Greatly elevated sedimentation rate at 102 and CRP at 41.  Rheumatoid factor and CCP antibody all high positive.  Positive ASO titer with positive DNase B.  6/22 hand x-ray show no erosions.  MTX started 7/22.    Doing well.   Tender joint count is  0, swollen joint count is 0 , physician global is  0 , visual analog pain scale is 01, pt global is 8.   CDAI is 8 -low disease activity.  Prognosis is good.   Continue MTX.   Order for labs given.   Her chronically elevated ESR is likely associated with her Aralast infusions.   Recheck in 3 months.  High risk medication use  Methotrexate.  She understands the need for regular 6 to 8-week labs.   Primary osteoarthritis of both hands  Mild and stable. Not bothering her much today.  Chronic obstructive pulmonary disease, unspecified COPD type  Followed by pulmonology  Continues on Aralast every week    Orders Placed This Encounter   Procedures    CBC Auto Differential    Comprehensive Metabolic Panel    C-reactive Protein    Sedimentation Rate     New Medications Ordered This Visit   Medications    methotrexate 2.5 MG tablet     Sig: Take 6 tablets by mouth 1 (One) Time Per Week.     Dispense:  24 tablet     Refill:  3    folic acid (FOLVITE) 1 MG tablet     Sig: Take 1 tablet by mouth Daily. 1 tablet by  oral route every day     Dispense:  90 tablet     Refill:  1          Follow Up:   Return in about 3 months (around 7/10/2025).        Girma Steinberg MD  [facility]

## 2025-04-10 ENCOUNTER — OFFICE VISIT (OUTPATIENT)
Age: 70
End: 2025-04-10
Payer: MEDICARE

## 2025-04-10 VITALS
SYSTOLIC BLOOD PRESSURE: 128 MMHG | HEART RATE: 97 BPM | BODY MASS INDEX: 31.91 KG/M2 | DIASTOLIC BLOOD PRESSURE: 62 MMHG | HEIGHT: 61 IN | TEMPERATURE: 97.5 F | WEIGHT: 169 LBS

## 2025-04-10 DIAGNOSIS — M05.79 RHEUMATOID ARTHRITIS INVOLVING MULTIPLE SITES WITH POSITIVE RHEUMATOID FACTOR: Primary | ICD-10-CM

## 2025-04-10 DIAGNOSIS — M19.042 PRIMARY OSTEOARTHRITIS OF BOTH HANDS: ICD-10-CM

## 2025-04-10 DIAGNOSIS — Z79.899 HIGH RISK MEDICATION USE: ICD-10-CM

## 2025-04-10 DIAGNOSIS — M19.041 PRIMARY OSTEOARTHRITIS OF BOTH HANDS: ICD-10-CM

## 2025-04-10 DIAGNOSIS — J44.9 CHRONIC OBSTRUCTIVE PULMONARY DISEASE, UNSPECIFIED COPD TYPE: ICD-10-CM

## 2025-04-10 RX ORDER — VALSARTAN 160 MG/1
320 TABLET ORAL DAILY
COMMUNITY

## 2025-04-10 RX ORDER — AMLODIPINE BESYLATE 2.5 MG/1
2.5 TABLET ORAL DAILY
COMMUNITY

## 2025-04-10 RX ORDER — METHOTREXATE 2.5 MG/1
15 TABLET ORAL WEEKLY
Qty: 24 TABLET | Refills: 3 | Status: SHIPPED | OUTPATIENT
Start: 2025-04-10

## 2025-04-10 RX ORDER — FOLIC ACID 1 MG/1
1 TABLET ORAL DAILY
Qty: 90 TABLET | Refills: 1 | Status: SHIPPED | OUTPATIENT
Start: 2025-04-10